# Patient Record
Sex: FEMALE | Race: WHITE | URBAN - METROPOLITAN AREA
[De-identification: names, ages, dates, MRNs, and addresses within clinical notes are randomized per-mention and may not be internally consistent; named-entity substitution may affect disease eponyms.]

---

## 2024-02-13 ENCOUNTER — LAB (OUTPATIENT)
Dept: URBAN - METROPOLITAN AREA CLINIC 50 | Facility: CLINIC | Age: 84
End: 2024-02-13

## 2024-02-13 ENCOUNTER — OV NP (OUTPATIENT)
Dept: URBAN - METROPOLITAN AREA CLINIC 50 | Facility: CLINIC | Age: 84
End: 2024-02-13
Payer: MEDICARE

## 2024-02-13 VITALS
TEMPERATURE: 97.1 F | WEIGHT: 171.8 LBS | HEIGHT: 64 IN | DIASTOLIC BLOOD PRESSURE: 83 MMHG | SYSTOLIC BLOOD PRESSURE: 125 MMHG | BODY MASS INDEX: 29.33 KG/M2 | HEART RATE: 64 BPM

## 2024-02-13 DIAGNOSIS — K57.90 DIVERTICULOSIS: ICD-10-CM

## 2024-02-13 DIAGNOSIS — K57.92 DIVERTICULITIS: ICD-10-CM

## 2024-02-13 DIAGNOSIS — I48.91 ATRIAL FIBRILLATION, UNSPECIFIED TYPE: ICD-10-CM

## 2024-02-13 DIAGNOSIS — K21.9 GASTROESOPHAGEAL REFLUX DISEASE, UNSPECIFIED WHETHER ESOPHAGITIS PRESENT: ICD-10-CM

## 2024-02-13 PROBLEM — 307496006: Status: ACTIVE | Noted: 2024-02-13

## 2024-02-13 PROBLEM — 309298003: Status: ACTIVE | Noted: 2024-02-13

## 2024-02-13 PROBLEM — 397881000: Status: ACTIVE | Noted: 2024-02-13

## 2024-02-13 PROBLEM — 235595009: Status: ACTIVE | Noted: 2024-02-13

## 2024-02-13 PROBLEM — 49436004: Status: ACTIVE | Noted: 2024-02-13

## 2024-02-13 PROCEDURE — 99204 OFFICE O/P NEW MOD 45 MIN: CPT | Performed by: PHYSICIAN ASSISTANT

## 2024-02-13 PROCEDURE — 99244 OFF/OP CNSLTJ NEW/EST MOD 40: CPT | Performed by: PHYSICIAN ASSISTANT

## 2024-02-13 RX ORDER — PANTOPRAZOLE SODIUM 40 MG/1
1 TABLET TABLET, DELAYED RELEASE ORAL ONCE A DAY
Qty: 90 TABLET | Refills: 0 | OUTPATIENT
Start: 2024-02-13

## 2024-02-13 RX ORDER — OMEPRAZOLE 40 MG/1
1 CAPSULE 30 MINUTES BEFORE MORNING MEAL CAPSULE, DELAYED RELEASE ORAL ONCE A DAY
Status: ACTIVE | COMMUNITY

## 2024-02-13 NOTE — HPI-TODAY'S VISIT:
Patient 82 y/o F of [place] Patient Primary Care here for f/u after recent ER visit for diverticulitis Seen 2/6/24 at [facility] ER for 3 days abdominal pains, CBC, CMP, UA from that time unremakrable CT A/P from that time w uncomplicated upper sigmoid colonic diverticuliltis per report from [facility] portal. Was discharged on Augmentin and recommended outpatient GI follow up Notes this is first episode of diverticulitis, but has known about diverticulosis from prev colonsocopy reports States after 3 days or so on abx, started feeling better Working on low fiber/soft foods Taking a break from simponi and celebrex per recommendation by rheumatology -- awaiting for finishing abx and rescheduling per their office recommendations Describes L sided abdominal pains, now much better in past few days but still mildly persistent, still finishing abx BMs daily, takes daily miralax, stool softener, probiotic to help promote bowel movement, does have hx constipation but no issues w this in past 10 yrs since using miralax Bowel habits have remained unchanged since onset and prior No blood/mucus in stool, does note hx rectal fissure and hemorrhoids with bleeding in past per prev GI but no recent issue w this  Last colonoscopy 8/14 presented and reviewed today appropriate, denies hx colon polyps, copy made to be scanned to chart No nausea/vomiting -- nausea resolved since onset Appetite getting better now since treatment, no weight loss lately Also concerned has hx GERD, on daily omeprazole 40mg per her rheumatologist, worse lately somewhat w more indigestion, burping No dysphagia, does admit prev EGD w dilation around 2020- Dr. James Hooks Hx a-fib, on xarelto w cardiology - Dr. Ken Mathew

## 2024-02-13 NOTE — PHYSICAL EXAM GASTROINTESTINAL
Abdomen , soft, mild LLQ tenderness, nondistended , no guarding or rigidity , no masses palpable , normal bowel sounds , Liver and Spleen,  no hepatosplenomegaly , liver nontender

## 2024-03-20 ENCOUNTER — OV NP (OUTPATIENT)
Dept: URBAN - METROPOLITAN AREA CLINIC 96 | Facility: CLINIC | Age: 84
End: 2024-03-20

## 2024-04-08 ENCOUNTER — OV EP (OUTPATIENT)
Dept: URBAN - METROPOLITAN AREA CLINIC 50 | Facility: CLINIC | Age: 84
End: 2024-04-08
Payer: MEDICARE

## 2024-04-08 ENCOUNTER — LAB (OUTPATIENT)
Dept: URBAN - METROPOLITAN AREA CLINIC 50 | Facility: CLINIC | Age: 84
End: 2024-04-08

## 2024-04-08 VITALS
BODY MASS INDEX: 29.16 KG/M2 | WEIGHT: 170.8 LBS | DIASTOLIC BLOOD PRESSURE: 93 MMHG | HEIGHT: 64 IN | HEART RATE: 62 BPM | TEMPERATURE: 97.8 F | SYSTOLIC BLOOD PRESSURE: 146 MMHG

## 2024-04-08 DIAGNOSIS — K21.9 GASTROESOPHAGEAL REFLUX DISEASE, UNSPECIFIED WHETHER ESOPHAGITIS PRESENT: ICD-10-CM

## 2024-04-08 DIAGNOSIS — Z79.1 NSAID LONG-TERM USE: ICD-10-CM

## 2024-04-08 DIAGNOSIS — Z87.19 HISTORY OF RECTAL FISSURE: ICD-10-CM

## 2024-04-08 DIAGNOSIS — Z79.01 ON ANTICOAGULANT THERAPY: ICD-10-CM

## 2024-04-08 DIAGNOSIS — R10.9 LEFT SIDED ABDOMINAL PAIN: ICD-10-CM

## 2024-04-08 DIAGNOSIS — K57.90 DIVERTICULAR DISEASE: ICD-10-CM

## 2024-04-08 DIAGNOSIS — K62.5 BRBPR (BRIGHT RED BLOOD PER RECTUM): ICD-10-CM

## 2024-04-08 PROBLEM — 266997008: Status: ACTIVE | Noted: 2024-04-08

## 2024-04-08 PROBLEM — 74474003: Status: ACTIVE | Noted: 2024-04-08

## 2024-04-08 PROBLEM — 397881000: Status: ACTIVE | Noted: 2024-04-08

## 2024-04-08 PROBLEM — 305058001: Status: ACTIVE | Noted: 2024-04-08

## 2024-04-08 PROBLEM — 23913003: Status: ACTIVE | Noted: 2024-04-08

## 2024-04-08 PROBLEM — 285387005: Status: ACTIVE | Noted: 2024-04-08

## 2024-04-08 PROCEDURE — 99214 OFFICE O/P EST MOD 30 MIN: CPT | Performed by: PHYSICIAN ASSISTANT

## 2024-04-08 RX ORDER — PANTOPRAZOLE SODIUM 20 MG/1
1 TABLET TABLET, DELAYED RELEASE ORAL ONCE A DAY
Qty: 90 TABLET | Refills: 3 | OUTPATIENT
Start: 2024-04-08

## 2024-04-08 NOTE — PHYSICAL EXAM CONSTITUTIONAL:
well developed, well nourished , in no acute distress , ambulating without difficulty , normal communication ability .

## 2024-04-08 NOTE — HPI-TODAY'S VISIT:
4/8/24: Patient is 82 y/o F here for f/u on rectal bleeding States started watching avoiding nuts, certain foods to help  However Friday, concerned having red blood with wiping Thought was initially was vaginal, but fairly certain is retal Worried on fissure or hemmorhoid causing Would like to get refill of fissure topical compounded measure from use in years past at [facility] compoudned Some increased LLQ abd pains since this started last few days Using daily miralax, stool softener 1-2 hrs after BMs, getting gas States bowel habits unchanged in past few days, but feels genrally less prodcutive BMs, more gassy since diverticulitis in Feb No nausea/vomiting, appetite comes and goes last couple days Some increased fatigue/malaise last couple days No rectal pains or irritation, using rectal ointment BRBPR, in toilet bowl and wiping, not significant amount but present States indigestion/heartburn much better since switch to pantoprazole, asyx now, no dysphagia, feels great w this Today prseents record of most recent EGD w dil 1/21 w Dr. James Roblero, no path report available Was arranged for C-scope but not scheduled after last visit as no cardiac clearance was received -- 2/13/24: Patient 82 y/o F of [place] Patient Primary Care here for f/u after recent ER visit for diverticulitis Seen 2/6/24 at [facility] ER for 3 days abdominal pains, CBC, CMP, UA from that time unremakrable CT A/P from that time w uncomplicated upper sigmoid colonic diverticuliltis per report from [facility] portal. Was discharged on Augmentin and recommended outpatient GI follow up Notes this is first episode of diverticulitis, but has known about diverticulosis from prev colonsocopy reports States after 3 days or so on abx, started feeling better Working on low fiber/soft foods Taking a break from simponi and celebrex per recommendation by rheumatology -- awaiting for finishing abx and rescheduling per their office recommendations Describes L sided abdominal pains, now much better in past few days but still mildly persistent, still finishing abx BMs daily, takes daily miralax, stool softener, probiotic to help promote bowel movement, does have hx constipation but no issues w this in past 10 yrs since using miralax Bowel habits have remained unchanged since onset and prior No blood/mucus in stool, does note hx rectal fissure and hemorrhoids with bleeding in past per prev GI but no recent issue w this  Last colonoscopy 8/14 presented and reviewed today appropriate, denies hx colon polyps, copy made to be scanned to chart No nausea/vomiting -- nausea resolved since onset Appetite getting better now since treatment, no weight loss lately Also concerned has hx GERD, on daily omeprazole 40mg per her rheumatologist, worse lately somewhat w more indigestion, burping No dysphagia, does admit prev EGD w dilation around 2020- Dr. James Hooks Hx a-fib, on xarelto w cardiology - Dr. Ken Mathew

## 2024-04-08 NOTE — PHYSICAL EXAM GASTROINTESTINAL
Abdomen , soft, mild L upper and lower abd tender, nondistended , no guarding or rigidity , no masses palpable , normal bowel sounds , Liver and Spleen,  no hepatosplenomegaly , liver nontender, Rectal, external skin tags, normal sphincter tone, no internal hemorrhoids, rectal masses or bleeding present

## 2024-04-09 LAB
A/G RATIO: 1
ALBUMIN: 3.8
ALKALINE PHOSPHATASE: 74
ALT (SGPT): 10
AST (SGOT): 21
BILIRUBIN, TOTAL: 0.5
BUN/CREATININE RATIO: (no result)
BUN: 24
CALCIUM: 9.4
CARBON DIOXIDE, TOTAL: 29
CHLORIDE: 97
CREATININE: 0.9
EGFR: 64
GLOBULIN, TOTAL: 3.7
GLUCOSE: 129
HEMATOCRIT: 44
HEMOGLOBIN: 14.7
MCH: 33.6
MCHC: 33.4
MCV: 100.5
MPV: 9.8
PLATELET COUNT: 200
POTASSIUM: 4.4
PROTEIN, TOTAL: 7.5
RDW: 13.3
RED BLOOD CELL COUNT: 4.38
SODIUM: 135
WHITE BLOOD CELL COUNT: 5.2

## 2024-06-03 ENCOUNTER — WEB ENCOUNTER (OUTPATIENT)
Dept: URBAN - METROPOLITAN AREA CLINIC 50 | Facility: CLINIC | Age: 84
End: 2024-06-03

## 2024-06-03 RX ORDER — PANTOPRAZOLE SODIUM 40 MG/1
1 TABLET TABLET, DELAYED RELEASE ORAL ONCE A DAY
Qty: 90 TABLET | Refills: 3 | OUTPATIENT
Start: 2024-06-03

## 2024-07-21 ENCOUNTER — WEB ENCOUNTER (OUTPATIENT)
Dept: URBAN - METROPOLITAN AREA CLINIC 50 | Facility: CLINIC | Age: 84
End: 2024-07-21

## 2024-07-22 ENCOUNTER — WEB ENCOUNTER (OUTPATIENT)
Dept: URBAN - METROPOLITAN AREA CLINIC 50 | Facility: CLINIC | Age: 84
End: 2024-07-22

## 2024-07-24 ENCOUNTER — DASHBOARD ENCOUNTERS (OUTPATIENT)
Age: 84
End: 2024-07-24

## 2024-07-24 ENCOUNTER — OFFICE VISIT (OUTPATIENT)
Dept: URBAN - METROPOLITAN AREA CLINIC 50 | Facility: CLINIC | Age: 84
End: 2024-07-24
Payer: MEDICARE

## 2024-07-24 VITALS
HEIGHT: 64 IN | WEIGHT: 167.6 LBS | BODY MASS INDEX: 28.61 KG/M2 | HEART RATE: 60 BPM | SYSTOLIC BLOOD PRESSURE: 116 MMHG | TEMPERATURE: 97.2 F | DIASTOLIC BLOOD PRESSURE: 77 MMHG

## 2024-07-24 DIAGNOSIS — R10.13 EPIGASTRIC DISCOMFORT: ICD-10-CM

## 2024-07-24 DIAGNOSIS — K57.30 ACQUIRED DIVERTICULOSIS OF COLON: ICD-10-CM

## 2024-07-24 DIAGNOSIS — K21.9 GASTROESOPHAGEAL REFLUX DISEASE, UNSPECIFIED WHETHER ESOPHAGITIS PRESENT: ICD-10-CM

## 2024-07-24 PROCEDURE — 99214 OFFICE O/P EST MOD 30 MIN: CPT | Performed by: PHYSICIAN ASSISTANT

## 2024-07-24 RX ORDER — PANTOPRAZOLE SODIUM 20 MG/1
1 TABLET TABLET, DELAYED RELEASE ORAL ONCE A DAY
Qty: 90 TABLET | Refills: 3 | Status: ACTIVE | COMMUNITY

## 2024-07-24 RX ORDER — PANTOPRAZOLE SODIUM 40 MG/1
1 TABLET TABLET, DELAYED RELEASE ORAL ONCE A DAY
Qty: 90 TABLET | Refills: 3 | Status: ON HOLD | COMMUNITY

## 2024-07-24 RX ORDER — SUCRALFATE 1 G/1
1 TABLET ON AN EMPTY STOMACH TABLET ORAL TWICE A DAY
Qty: 60 | Refills: 3 | Status: ON HOLD | COMMUNITY

## 2024-07-24 NOTE — HPI-TODAY'S VISIT:
7/24/24: 84 y/o F here for f/u discussion on GERD and epigastric discomfort, worsening in past 2 weeks Did some steroid injections about 2-3 weeks ago and really set off stomach syx Stopped celebrex thereafter as thought may had worsened syx for about past weeks States recently stopped Simponi also, prev on Remicade for RA issuse  Believes this irritated stomach, few hours later started bad abdominal pains Was hurting really bad for about 6 days, but now improving Tried increasing pantoprazole back to 40mg for a week or two while was resolving, seemed to help, now decreased back to 20mg Describes abdominal pains as epigastric to LUQ discomfort/pains under ribs, but now better No nausea/vomiting, no indigestion/heartburn, no dysphagia Occ fullness sensation in stomach, some bloating but overall feeling better Tries to watch foods that may irrirate symptoms Wonders if she needs EGD BMs normal, going daily, no blood/melena/mucus -- 4/8/24: Patient is 84 y/o F here for f/u on rectal bleeding States started watching avoiding nuts, certain foods to help  However Friday, concerned having red blood with wiping Thought was initially was vaginal, but fairly certain is retal Worried on fissure or hemmorhoid causing Would like to get refill of fissure topical compounded measure from use in years past at [facility] compoudned Some increased LLQ abd pains since this started last few days Using daily miralax, stool softener 1-2 hrs after BMs, getting gas States bowel habits unchanged in past few days, but feels genrally less prodcutive BMs, more gassy since diverticulitis in Feb No nausea/vomiting, appetite comes and goes last couple days Some increased fatigue/malaise last couple days No rectal pains or irritation, using rectal ointment BRBPR, in toilet bowl and wiping, not significant amount but present States indigestion/heartburn much better since switch to pantoprazole, asyx now, no dysphagia, feels great w this Today prseents record of most recent EGD w dil 1/21 w Dr. James Roblero, no path report available Was arranged for C-scope but not scheduled after last visit as no cardiac clearance was received -- 2/13/24: Patient 84 y/o F of [place] Patient Primary Care here for f/u after recent ER visit for diverticulitis Seen 2/6/24 at [facility] ER for 3 days abdominal pains, CBC, CMP, UA from that time unremakrable CT A/P from that time w uncomplicated upper sigmoid colonic diverticuliltis per report from [facility] portal. Was discharged on Augmentin and recommended outpatient GI follow up Notes this is first episode of diverticulitis, but has known about diverticulosis from prev colonsocopy reports States after 3 days or so on abx, started feeling better Working on low fiber/soft foods Taking a break from simponi and celebrex per recommendation by rheumatology -- awaiting for finishing abx and rescheduling per their office recommendations Describes L sided abdominal pains, now much better in past few days but still mildly persistent, still finishing abx BMs daily, takes daily miralax, stool softener, probiotic to help promote bowel movement, does have hx constipation but no issues w this in past 10 yrs since using miralax Bowel habits have remained unchanged since onset and prior No blood/mucus in stool, does note hx rectal fissure and hemorrhoids with bleeding in past per prev GI but no recent issue w this  Last colonoscopy 8/14 presented and reviewed today appropriate, denies hx colon polyps, copy made to be scanned to chart No nausea/vomiting -- nausea resolved since onset Appetite getting better now since treatment, no weight loss lately Also concerned has hx GERD, on daily omeprazole 40mg per her rheumatologist, worse lately somewhat w more indigestion, burping No dysphagia, does admit prev EGD w dilation around 2020- Dr. James Hooks Hx a-fib, on xarelto w cardiology - Dr. Ken Matehw Called the CARES line to request a TSERING screening. Spoke to [facility]. He will dispatch [id number]. HSI # is [id number].

## 2024-08-04 ENCOUNTER — WEB ENCOUNTER (OUTPATIENT)
Dept: URBAN - METROPOLITAN AREA CLINIC 50 | Facility: CLINIC | Age: 84
End: 2024-08-04

## 2024-08-06 ENCOUNTER — WEB ENCOUNTER (OUTPATIENT)
Dept: URBAN - METROPOLITAN AREA CLINIC 50 | Facility: CLINIC | Age: 84
End: 2024-08-06

## 2024-08-07 ENCOUNTER — TELEPHONE ENCOUNTER (OUTPATIENT)
Dept: URBAN - METROPOLITAN AREA CLINIC 50 | Facility: CLINIC | Age: 84
End: 2024-08-07

## 2024-08-08 ENCOUNTER — WEB ENCOUNTER (OUTPATIENT)
Dept: URBAN - METROPOLITAN AREA CLINIC 50 | Facility: CLINIC | Age: 84
End: 2024-08-08

## 2024-09-10 ENCOUNTER — OFFICE VISIT (OUTPATIENT)
Dept: URBAN - METROPOLITAN AREA CLINIC 50 | Facility: CLINIC | Age: 84
End: 2024-09-10

## 2025-03-25 ENCOUNTER — TELEPHONE ENCOUNTER (OUTPATIENT)
Dept: URBAN - METROPOLITAN AREA CLINIC 50 | Facility: CLINIC | Age: 85
End: 2025-03-25

## 2025-03-25 RX ORDER — PANTOPRAZOLE SODIUM 20 MG/1
1 TABLET TABLET, DELAYED RELEASE ORAL ONCE A DAY
Qty: 90 TABLET | Refills: 1